# Patient Record
Sex: FEMALE | Race: WHITE
[De-identification: names, ages, dates, MRNs, and addresses within clinical notes are randomized per-mention and may not be internally consistent; named-entity substitution may affect disease eponyms.]

---

## 2019-07-14 ENCOUNTER — HOSPITAL ENCOUNTER (EMERGENCY)
Dept: HOSPITAL 74 - FER | Age: 24
Discharge: HOME | End: 2019-07-14
Payer: COMMERCIAL

## 2019-07-14 VITALS — SYSTOLIC BLOOD PRESSURE: 132 MMHG | HEART RATE: 100 BPM | DIASTOLIC BLOOD PRESSURE: 87 MMHG | TEMPERATURE: 99.1 F

## 2019-07-14 VITALS — BODY MASS INDEX: 25.9 KG/M2

## 2019-07-14 DIAGNOSIS — L02.31: Primary | ICD-10-CM

## 2019-07-14 PROCEDURE — 0W9N0ZZ DRAINAGE OF FEMALE PERINEUM, OPEN APPROACH: ICD-10-PCS

## 2019-07-14 NOTE — PDOC
History of Present Illness





- General


Chief Complaint: Abscess Boil


Stated Complaint: ABSCESS


Time Seen by Provider: 07/14/19 20:29





- History of Present Illness


Initial Comments: 





07/14/19 20:38


Mavis De La Cruz is a 24yF with PMHx of external hemorrhoids, IBS presenting 

with abscess. Noted pain and lump in anal region in March 2019. Over next 4 

months, took 10d doxycycline and 5d clindamycin at a later date without relief. 

PCP noted erythematous external hemorrhoid on exam, unsure whether it was 

abscess or cyst. Yesterday, noted continuous blood and clear brown discharge 

from anal region. Currently has some pain with walking. Denies fever, chills, 

urinary urgency/hematuria/pyuria, vaginal discharge or bleeding. Never sexually 

active.








Past History





- Past Medical History


Allergies/Adverse Reactions: 


 Allergies











Allergy/AdvReac Type Severity Reaction Status Date / Time


 


Penicillins Allergy Unknown  Verified 07/14/19 20:31











Home Medications: 


Ambulatory Orders





Clindamycin [Cleocin -] 600 mg PO Q6H 07/14/19 


Doxycycline Hyclate [Vibramycin] 100 mg PO BID 07/14/19 


Norethindrone-E.estradiol-Iron [Junel Fe 1.5 mg-30 Mcg Tablet] 1 each PO DAILY 

07/14/19 


Omeprazole 20 mg PO DAILY PRN 07/14/19 











**Review of Systems





- Review of Systems


Constitutional: No: Chills, Fever, Malaise


HEENTM: No: Eye Pain, Nose Pain, Nose Bleeding, Throat Pain, Mouth Pain


Respiratory: No: Cough, Shortness of Breath, Stridor


Cardiac (ROS): No: Chest Pain, Edema, Lightheadedness, Palpitations


ABD/GI: No: Abdominal Distended, Constipated, Diarrhea, Nausea, Rectal Bleeding

, Vomiting


: No: Burning, Dysuria, Discharge, Frequency, Flank Pain, Hematuria, 

Incontinence


Musculoskeletal: No: Back Pain, Joint Pain, Joint Swelling, Muscle Pain


Integumentary: No: Bruising, Change in Color, Erythema, Flushing


Neurological: No: Headache, Numbness, Paresthesia, Seizure, Tingling, Tremors


Psychiatric: No: Anxiety, Depression, Stressors


Endocrine: No: Excessive Sweating, Flushing, Intolerance to Cold, Intolerance 

to Heat


Hematologic/Lymphatic: No: Anemia





*Physical Exam





- Physical Exam


General Appearance: Yes: Nourished, Appropriately Dressed.  No: Apparent 

Distress


HEENT: positive: EOMI, DENTON, Hearing Grossly Normal.  negative: Pale 

Conjunctivae, Nasal Congestion, Rhinorrhea


Respiratory/Chest: positive: Lungs Clear, Normal Breath Sounds.  negative: 

Chest Tender, Respiratory Distress, Crackles, Rales, Rhonchi, Stridor, Wheezing


Cardiovascular: positive: Regular Rhythm, Regular Rate, S1, S2.  negative: Edema

, Murmur


Rectal Exam: positive: hemorrhoids, other (3cm erythematous abscess on R 

inferior gluteal cleft 3cm away from rectal opening, minimal clear discharge 

with expression, tender to palpation)


Integumentary: positive: Normal Color


Neurologic: positive: Fully Oriented, Alert, Normal Mood/Affect, Normal Response

, Responsive.  negative: Confused, Disoriented





Medical Decision Making





- Medical Decision Making





07/14/19 22:05


Mavis De La Cruz is a 24yF with PMHx of external hemorrhoids, IBS presenting 

with gluteal cleft abscess. 3cm tender, erythematous abscess on R inferior 

gluteal cleft seen on exam with minimal bleeding and discharge. Discharged home 

with instructions to apply gauze with bacitracin to the abscess until bleeding 

stops. Also apply lidocaine to affected area if tender. 





*DC/Admit/Observation/Transfer


Diagnosis at time of Disposition: 


 Abscess, gluteal cleft








- Discharge Dispostion


Disposition: HOME


Condition at time of disposition: Good


Decision to Admit order: No





- Referrals


Referrals: 


Ivanna Garrido [Primary Care Provider] - 





- Patient Instructions


Printed Discharge Instructions:  DI for Anal Abscess


Additional Instructions: 


You were seen in the ED for your abscess. Apply lidocaine to the affected area 

if tender. Continue to apply gauze covered with bacitracin to the affected area 

until the bleeding and discharge stops.





Please follow up with your primary care doctor regarding your ED visit.





Come back to the ED if you have worsening pain, fever, or vomiting.





- Post Discharge Activity

## 2019-07-15 NOTE — PDOC
Documentation entered by Sarah Huynh SCRIBE, acting as scribe for Gonzalo Gomez MD.








Gonzalo Gomez MD:  This documentation has been prepared by the Lino sheets Brenda, SCRIBE, under my direction and personally reviewed by me in its 

entirety.  I confirm that the documentation accurately reflects all work, 

treatment, procedures, and medical decision making performed by me.  





Attending Attestation





- Resident


Resident Name: Sigifredo Matute





- ED Attending Attestation


I have performed the following: I have examined & evaluated the patient, The 

case was reviewed & discussed with the resident, I agree w/resident's findings 

& plan, Exceptions are as noted





- HPI


HPI: 





07/14/19 21:02


The patient is a 24 year old female, with a significant PMH of hemorrhoids who 

presents to the emergency department for evaluation of an abnormal growth on 

the right buttock. The patient reports having issues with hemorrhoids in June 2019, at which time she discovered the abcess. The patient reports feeling a 

pop in the abscess yesterday, and seeing clear/brown drainage. She states the 

abscess has been bleeding since this morning, and the pain is worsened with 

walking. She reports visiting her PCP for evaluation in June 2019 when the 

abcess first appeared,  and was given doxycycline for 10 days to no avail. She 

then states getting a colonoscopy, with no findings. The patient then reports 

going on a cruise ship, where she felt pain walking due to the abcess, and was 

given Clindamycin by the ship doctor. Upon arriving home she visited the NP, 

who reported that the abscess was not yellow in color enough to be drained. 


 


The patient denies trauma. Denies chest pain, shortness of breath,headache and 

dizziness. Denies fever, chills, nausea, vomiting, diarrhea


 





Allergies: Penicillin 


Past surgical history:


Social history: No reported


PCP: Dr. HUMAIRA Garrido 


 











- Physicial Exam


PE: 





07/14/19 21:02


GENERAL: Awake, alert, and fully oriented, in no acute distress


HEAD: No signs of trauma


EYES: PERRLA, EOMI, sclera anicteric, conjunctiva clear


ENT: Auricles normal inspection, hearing grossly normal, nares patent, 

oropharynx clear without exudates. Moist mucosa


NECK: Normal ROM, supple, no lymphadenopathy, JVD, or masses


LUNGS: Breath sounds equal, clear to auscultation bilaterally.  No wheezes, and 

no crackles


HEART: Regular rate and rhythm, normal S1 and S2, no murmurs, rubs or gallops


ABDOMEN: Soft, nontender, normoactive bowel sounds.  No guarding, no rebound.  

No masses


EXTREMITIES: Normal range of motion, no edema.  No clubbing or cyanosis. No 

cords, erythema, or tenderness


NEUROLOGICAL: Cranial nerves II through XII grossly intact.  Normal speech, 

normal gait


SKIN: +Slight area of ulceration and erythema on right buttock without 

tenderness or fluctuations. Warm, Dry, normal turgor.





- Medical Decision Making





07/15/19 03:17


no evidence of residual infection


topical bacitracin and viscous lidocaine